# Patient Record
Sex: MALE | Race: WHITE | NOT HISPANIC OR LATINO | Employment: OTHER | URBAN - METROPOLITAN AREA
[De-identification: names, ages, dates, MRNs, and addresses within clinical notes are randomized per-mention and may not be internally consistent; named-entity substitution may affect disease eponyms.]

---

## 2017-12-11 ENCOUNTER — IOP CHECK (OUTPATIENT)
Dept: URBAN - METROPOLITAN AREA CLINIC 43 | Facility: CLINIC | Age: 82
End: 2017-12-11

## 2017-12-11 DIAGNOSIS — H40.1132: ICD-10-CM

## 2017-12-11 PROCEDURE — G8428 CUR MEDS NOT DOCUMENT: HCPCS

## 2017-12-11 PROCEDURE — 92012 INTRM OPH EXAM EST PATIENT: CPT

## 2017-12-11 PROCEDURE — G8756 NO BP MEASURE DOC: HCPCS

## 2017-12-11 PROCEDURE — 92083 EXTENDED VISUAL FIELD XM: CPT

## 2017-12-11 PROCEDURE — 1036F TOBACCO NON-USER: CPT

## 2017-12-11 ASSESSMENT — TONOMETRY
OS_IOP_MMHG: 14
OD_IOP_MMHG: 13

## 2017-12-11 ASSESSMENT — VISUAL ACUITY
OD_SC: 20/60-2
OS_SC: 20/30-1

## 2018-06-18 ENCOUNTER — ESTABLISHED COMPREHENSIVE EXAM (OUTPATIENT)
Dept: URBAN - METROPOLITAN AREA CLINIC 43 | Facility: CLINIC | Age: 83
End: 2018-06-18

## 2018-06-18 DIAGNOSIS — Z96.1: ICD-10-CM

## 2018-06-18 DIAGNOSIS — H40.1132: ICD-10-CM

## 2018-06-18 DIAGNOSIS — H04.123: ICD-10-CM

## 2018-06-18 DIAGNOSIS — E11.9: ICD-10-CM

## 2018-06-18 PROCEDURE — G8427 DOCREV CUR MEDS BY ELIG CLIN: HCPCS

## 2018-06-18 PROCEDURE — 92014 COMPRE OPH EXAM EST PT 1/>: CPT

## 2018-06-18 PROCEDURE — G8756 NO BP MEASURE DOC: HCPCS

## 2018-06-18 PROCEDURE — 92015 DETERMINE REFRACTIVE STATE: CPT

## 2018-06-18 PROCEDURE — 2022F DILAT RTA XM EVC RTNOPTHY: CPT

## 2018-06-18 PROCEDURE — 1036F TOBACCO NON-USER: CPT

## 2018-06-18 PROCEDURE — 92133 CPTRZD OPH DX IMG PST SGM ON: CPT

## 2018-06-18 ASSESSMENT — TONOMETRY: OS_IOP_MMHG: 14

## 2018-06-18 ASSESSMENT — VISUAL ACUITY
OS_CC: J2 OTC
OS_SC: 20/25-2
OD_PH: 20/70+1
OD_SC: 20/80+1
OS_SC: J10
OD_SC: J12

## 2018-09-19 ENCOUNTER — EST. PATIENT EMERGENCY (OUTPATIENT)
Dept: URBAN - METROPOLITAN AREA CLINIC 43 | Facility: CLINIC | Age: 83
End: 2018-09-19

## 2018-09-19 DIAGNOSIS — H00.12: ICD-10-CM

## 2018-09-19 PROCEDURE — 1036F TOBACCO NON-USER: CPT

## 2018-09-19 PROCEDURE — G9903 PT SCRN TBCO ID AS NON USER: HCPCS

## 2018-09-19 PROCEDURE — 92012 INTRM OPH EXAM EST PATIENT: CPT

## 2018-09-19 PROCEDURE — G8428 CUR MEDS NOT DOCUMENT: HCPCS

## 2018-09-19 PROCEDURE — G8756 NO BP MEASURE DOC: HCPCS

## 2018-09-19 RX ORDER — NEOMYCIN SULFATE, POLYMYXIN B SULFATE AND DEXAMETHASONE 3.5; 10000; 1 MG/G; [USP'U]/G; MG/G: OINTMENT OPHTHALMIC

## 2018-09-19 ASSESSMENT — TONOMETRY
OD_IOP_MMHG: 13
OS_IOP_MMHG: 14

## 2018-09-19 ASSESSMENT — VISUAL ACUITY
OS_SC: 20/20-
OD_SC: 20/80-1+1

## 2018-12-03 ENCOUNTER — IOP CHECK (OUTPATIENT)
Dept: URBAN - METROPOLITAN AREA CLINIC 43 | Facility: CLINIC | Age: 83
End: 2018-12-03

## 2018-12-03 DIAGNOSIS — H40.1132: ICD-10-CM

## 2018-12-03 PROCEDURE — 92012 INTRM OPH EXAM EST PATIENT: CPT

## 2018-12-03 PROCEDURE — 1036F TOBACCO NON-USER: CPT

## 2018-12-03 PROCEDURE — G9903 PT SCRN TBCO ID AS NON USER: HCPCS

## 2018-12-03 PROCEDURE — 2027F OPTIC NERVE HEAD EVAL DONE: CPT

## 2018-12-03 PROCEDURE — 3284F IOP RED >=15% PRE-NTRV LVL: CPT

## 2018-12-03 PROCEDURE — 92083 EXTENDED VISUAL FIELD XM: CPT

## 2018-12-03 PROCEDURE — G8756 NO BP MEASURE DOC: HCPCS

## 2018-12-03 PROCEDURE — G8428 CUR MEDS NOT DOCUMENT: HCPCS

## 2018-12-03 ASSESSMENT — VISUAL ACUITY
OS_SC: 20/25-3
OD_SC: 20/100

## 2018-12-03 ASSESSMENT — TONOMETRY
OD_IOP_MMHG: 16
OS_IOP_MMHG: 15

## 2019-06-03 ENCOUNTER — ESTABLISHED COMPREHENSIVE EXAM (OUTPATIENT)
Dept: URBAN - METROPOLITAN AREA CLINIC 43 | Facility: CLINIC | Age: 84
End: 2019-06-03

## 2019-06-03 DIAGNOSIS — H04.123: ICD-10-CM

## 2019-06-03 DIAGNOSIS — H40.1132: ICD-10-CM

## 2019-06-03 DIAGNOSIS — E11.9: ICD-10-CM

## 2019-06-03 DIAGNOSIS — Z96.1: ICD-10-CM

## 2019-06-03 PROCEDURE — 92015 DETERMINE REFRACTIVE STATE: CPT

## 2019-06-03 PROCEDURE — 92014 COMPRE OPH EXAM EST PT 1/>: CPT

## 2019-06-03 PROCEDURE — 92133 CPTRZD OPH DX IMG PST SGM ON: CPT

## 2019-06-03 ASSESSMENT — TONOMETRY
OS_IOP_MMHG: 14
OD_IOP_MMHG: 13

## 2019-06-03 ASSESSMENT — VISUAL ACUITY
OD_SC: 20/80-1
OD_CC: J7
OS_SC: J8
OD_SC: J10
OS_SC: 20/20
OS_CC: J1

## 2019-12-04 ENCOUNTER — IOP CHECK (OUTPATIENT)
Dept: URBAN - METROPOLITAN AREA CLINIC 43 | Facility: CLINIC | Age: 84
End: 2019-12-04

## 2019-12-04 DIAGNOSIS — E11.9: ICD-10-CM

## 2019-12-04 DIAGNOSIS — H04.123: ICD-10-CM

## 2019-12-04 DIAGNOSIS — H40.1132: ICD-10-CM

## 2019-12-04 PROCEDURE — 92083 EXTENDED VISUAL FIELD XM: CPT

## 2019-12-04 PROCEDURE — 92012 INTRM OPH EXAM EST PATIENT: CPT

## 2019-12-04 ASSESSMENT — VISUAL ACUITY
OS_SC: 20/20
OD_SC: 20/80

## 2020-06-10 ENCOUNTER — ESTABLISHED COMPREHENSIVE EXAM (OUTPATIENT)
Dept: URBAN - METROPOLITAN AREA CLINIC 43 | Facility: CLINIC | Age: 85
End: 2020-06-10

## 2020-06-10 DIAGNOSIS — E11.9: ICD-10-CM

## 2020-06-10 DIAGNOSIS — H40.1132: ICD-10-CM

## 2020-06-10 DIAGNOSIS — H04.123: ICD-10-CM

## 2020-06-10 DIAGNOSIS — Z96.1: ICD-10-CM

## 2020-06-10 PROCEDURE — 92133 CPTRZD OPH DX IMG PST SGM ON: CPT

## 2020-06-10 PROCEDURE — 92014 COMPRE OPH EXAM EST PT 1/>: CPT

## 2020-06-10 PROCEDURE — 92015 DETERMINE REFRACTIVE STATE: CPT

## 2020-06-10 ASSESSMENT — TONOMETRY
OD_IOP_MMHG: 16
OS_IOP_MMHG: 17

## 2020-06-10 ASSESSMENT — VISUAL ACUITY
OD_SC: 20/80+1
OD_SC: J12
OD_CC: J10
OS_SC: J6
OS_CC: J1
OS_SC: 20/20-2

## 2020-12-16 ENCOUNTER — IOP CHECK (OUTPATIENT)
Dept: URBAN - METROPOLITAN AREA CLINIC 43 | Facility: CLINIC | Age: 85
End: 2020-12-16

## 2020-12-16 DIAGNOSIS — H04.123: ICD-10-CM

## 2020-12-16 DIAGNOSIS — E11.9: ICD-10-CM

## 2020-12-16 DIAGNOSIS — H40.1132: ICD-10-CM

## 2020-12-16 DIAGNOSIS — Z96.1: ICD-10-CM

## 2020-12-16 PROCEDURE — 92083 EXTENDED VISUAL FIELD XM: CPT

## 2020-12-16 PROCEDURE — 92250 FUNDUS PHOTOGRAPHY W/I&R: CPT

## 2020-12-16 PROCEDURE — 92012 INTRM OPH EXAM EST PATIENT: CPT

## 2020-12-16 ASSESSMENT — TONOMETRY
OD_IOP_MMHG: 9
OS_IOP_MMHG: 17

## 2020-12-16 ASSESSMENT — VISUAL ACUITY
OD_SC: 20/100+1
OS_SC: 20/20-2

## 2021-06-16 ENCOUNTER — ESTABLISHED COMPREHENSIVE EXAM (OUTPATIENT)
Dept: URBAN - METROPOLITAN AREA CLINIC 43 | Facility: CLINIC | Age: 86
End: 2021-06-16

## 2021-06-16 DIAGNOSIS — Z96.1: ICD-10-CM

## 2021-06-16 DIAGNOSIS — H40.1132: ICD-10-CM

## 2021-06-16 DIAGNOSIS — E11.9: ICD-10-CM

## 2021-06-16 DIAGNOSIS — H04.123: ICD-10-CM

## 2021-06-16 PROCEDURE — 92015 DETERMINE REFRACTIVE STATE: CPT

## 2021-06-16 PROCEDURE — 92133 CPTRZD OPH DX IMG PST SGM ON: CPT

## 2021-06-16 PROCEDURE — 92014 COMPRE OPH EXAM EST PT 1/>: CPT

## 2021-06-16 ASSESSMENT — VISUAL ACUITY
OS_CC: J1
OS_SC: J6-
OD_SC: 20/100-1
OD_SC: J12
OS_SC: 20/20
OD_CC: J12

## 2021-06-16 ASSESSMENT — TONOMETRY
OS_IOP_MMHG: 15
OD_IOP_MMHG: 9

## 2021-12-16 ENCOUNTER — ESTABLISHED PATIENT (OUTPATIENT)
Dept: URBAN - METROPOLITAN AREA CLINIC 43 | Facility: CLINIC | Age: 86
End: 2021-12-16

## 2021-12-16 DIAGNOSIS — E11.9: ICD-10-CM

## 2021-12-16 DIAGNOSIS — H40.1132: ICD-10-CM

## 2021-12-16 DIAGNOSIS — H04.123: ICD-10-CM

## 2021-12-16 PROCEDURE — 92083 EXTENDED VISUAL FIELD XM: CPT

## 2021-12-16 PROCEDURE — 92012 INTRM OPH EXAM EST PATIENT: CPT

## 2021-12-16 ASSESSMENT — VISUAL ACUITY
OS_SC: 20/20-2
OD_SC: 20/100-1

## 2021-12-16 ASSESSMENT — TONOMETRY
OS_IOP_MMHG: 18
OD_IOP_MMHG: 9

## 2022-07-19 ENCOUNTER — COMPREHENSIVE EXAM (OUTPATIENT)
Dept: URBAN - METROPOLITAN AREA CLINIC 43 | Facility: CLINIC | Age: 87
End: 2022-07-19

## 2022-07-19 DIAGNOSIS — H04.123: ICD-10-CM

## 2022-07-19 DIAGNOSIS — E11.9: ICD-10-CM

## 2022-07-19 DIAGNOSIS — Z96.1: ICD-10-CM

## 2022-07-19 DIAGNOSIS — H40.1132: ICD-10-CM

## 2022-07-19 PROCEDURE — 92015 DETERMINE REFRACTIVE STATE: CPT

## 2022-07-19 PROCEDURE — 92133 CPTRZD OPH DX IMG PST SGM ON: CPT

## 2022-07-19 PROCEDURE — 92014 COMPRE OPH EXAM EST PT 1/>: CPT

## 2022-07-19 ASSESSMENT — VISUAL ACUITY
OS_SC: 20/20-2
OS_SC: J4
OD_SC: 20/200
OD_SC: J12

## 2022-07-19 ASSESSMENT — TONOMETRY
OS_IOP_MMHG: 16
OD_IOP_MMHG: 12

## 2022-10-26 ENCOUNTER — APPOINTMENT (RX ONLY)
Dept: URBAN - METROPOLITAN AREA CLINIC 137 | Facility: CLINIC | Age: 87
Setting detail: DERMATOLOGY
End: 2022-10-26

## 2022-10-26 DIAGNOSIS — Z85.828 PERSONAL HISTORY OF OTHER MALIGNANT NEOPLASM OF SKIN: ICD-10-CM

## 2022-10-26 DIAGNOSIS — R60.0 LOCALIZED EDEMA: ICD-10-CM

## 2022-10-26 DIAGNOSIS — L57.8 OTHER SKIN CHANGES DUE TO CHRONIC EXPOSURE TO NONIONIZING RADIATION: ICD-10-CM

## 2022-10-26 DIAGNOSIS — D18.0 HEMANGIOMA: ICD-10-CM

## 2022-10-26 DIAGNOSIS — Z71.89 OTHER SPECIFIED COUNSELING: ICD-10-CM

## 2022-10-26 DIAGNOSIS — L57.0 ACTINIC KERATOSIS: ICD-10-CM

## 2022-10-26 PROBLEM — D18.01 HEMANGIOMA OF SKIN AND SUBCUTANEOUS TISSUE: Status: ACTIVE | Noted: 2022-10-26

## 2022-10-26 PROCEDURE — 17000 DESTRUCT PREMALG LESION: CPT

## 2022-10-26 PROCEDURE — ? LIQUID NITROGEN

## 2022-10-26 PROCEDURE — ? COUNSELING

## 2022-10-26 PROCEDURE — 99213 OFFICE O/P EST LOW 20 MIN: CPT | Mod: 25

## 2022-10-26 PROCEDURE — 17003 DESTRUCT PREMALG LES 2-14: CPT

## 2022-10-26 ASSESSMENT — LOCATION SIMPLE DESCRIPTION DERM
LOCATION SIMPLE: RIGHT PRETIBIAL REGION
LOCATION SIMPLE: NOSE
LOCATION SIMPLE: SCALP
LOCATION SIMPLE: LEFT FOREHEAD
LOCATION SIMPLE: LEFT PRETIBIAL REGION
LOCATION SIMPLE: ANTERIOR SCALP
LOCATION SIMPLE: LEFT TEMPLE
LOCATION SIMPLE: RIGHT FOREHEAD
LOCATION SIMPLE: CHEST

## 2022-10-26 ASSESSMENT — LOCATION ZONE DERM
LOCATION ZONE: NOSE
LOCATION ZONE: SCALP
LOCATION ZONE: TRUNK
LOCATION ZONE: LEG
LOCATION ZONE: FACE

## 2022-10-26 ASSESSMENT — LOCATION DETAILED DESCRIPTION DERM
LOCATION DETAILED: LEFT SUPERIOR TEMPLE
LOCATION DETAILED: LEFT SUPERIOR MEDIAL FOREHEAD
LOCATION DETAILED: LEFT INFERIOR LATERAL FOREHEAD
LOCATION DETAILED: LEFT SUPERIOR FOREHEAD
LOCATION DETAILED: RIGHT INFERIOR LATERAL FOREHEAD
LOCATION DETAILED: NASAL SUPRATIP
LOCATION DETAILED: MID-FRONTAL SCALP
LOCATION DETAILED: NASAL TIP
LOCATION DETAILED: RIGHT LATERAL FOREHEAD
LOCATION DETAILED: LEFT CENTRAL PARIETAL SCALP
LOCATION DETAILED: RIGHT PROXIMAL PRETIBIAL REGION
LOCATION DETAILED: RIGHT MEDIAL INFERIOR CHEST
LOCATION DETAILED: LEFT PROXIMAL PRETIBIAL REGION

## 2023-01-19 ENCOUNTER — ESTABLISHED PATIENT (OUTPATIENT)
Dept: URBAN - METROPOLITAN AREA CLINIC 43 | Facility: CLINIC | Age: 88
End: 2023-01-19

## 2023-01-19 DIAGNOSIS — H40.1132: ICD-10-CM

## 2023-01-19 DIAGNOSIS — E11.9: ICD-10-CM

## 2023-01-19 DIAGNOSIS — H04.123: ICD-10-CM

## 2023-01-19 PROCEDURE — 92012 INTRM OPH EXAM EST PATIENT: CPT

## 2023-01-19 PROCEDURE — 92083 EXTENDED VISUAL FIELD XM: CPT

## 2023-01-19 ASSESSMENT — VISUAL ACUITY
OS_SC: 20/20-2
OD_SC: 20/200

## 2023-01-19 ASSESSMENT — TONOMETRY
OS_IOP_MMHG: 26
OD_IOP_MMHG: 12

## 2023-04-11 ENCOUNTER — APPOINTMENT (RX ONLY)
Dept: URBAN - METROPOLITAN AREA CLINIC 137 | Facility: CLINIC | Age: 88
Setting detail: DERMATOLOGY
End: 2023-04-11

## 2023-04-11 DIAGNOSIS — L89 PRESSURE ULCER: ICD-10-CM

## 2023-04-11 DIAGNOSIS — Z85.828 PERSONAL HISTORY OF OTHER MALIGNANT NEOPLASM OF SKIN: ICD-10-CM

## 2023-04-11 DIAGNOSIS — R60.0 LOCALIZED EDEMA: ICD-10-CM | Status: INADEQUATELY CONTROLLED

## 2023-04-11 DIAGNOSIS — L82.1 OTHER SEBORRHEIC KERATOSIS: ICD-10-CM

## 2023-04-11 DIAGNOSIS — L57.0 ACTINIC KERATOSIS: ICD-10-CM

## 2023-04-11 DIAGNOSIS — S31000A OPEN WOUND(S) (MULTIPLE) OF UNSPECIFIED SITE(S), WITHOUT MENTION OF COMPLICATION: ICD-10-CM | Status: RESOLVING

## 2023-04-11 DIAGNOSIS — Z71.89 OTHER SPECIFIED COUNSELING: ICD-10-CM

## 2023-04-11 DIAGNOSIS — L85.3 XEROSIS CUTIS: ICD-10-CM | Status: INADEQUATELY CONTROLLED

## 2023-04-11 DIAGNOSIS — D18.0 HEMANGIOMA: ICD-10-CM

## 2023-04-11 DIAGNOSIS — L0391 CELLULITIS AND ABSCESS OF UNSPECIFIED SITES: ICD-10-CM

## 2023-04-11 DIAGNOSIS — L81.4 OTHER MELANIN HYPERPIGMENTATION: ICD-10-CM

## 2023-04-11 DIAGNOSIS — I87.2 VENOUS INSUFFICIENCY (CHRONIC) (PERIPHERAL): ICD-10-CM | Status: STABLE

## 2023-04-11 DIAGNOSIS — D69.2 OTHER NONTHROMBOCYTOPENIC PURPURA: ICD-10-CM | Status: STABLE

## 2023-04-11 DIAGNOSIS — L0390 CELLULITIS AND ABSCESS OF UNSPECIFIED SITES: ICD-10-CM

## 2023-04-11 DIAGNOSIS — L57.8 OTHER SKIN CHANGES DUE TO CHRONIC EXPOSURE TO NONIONIZING RADIATION: ICD-10-CM

## 2023-04-11 PROBLEM — L03.317 CELLULITIS OF BUTTOCK: Status: ACTIVE | Noted: 2023-04-11

## 2023-04-11 PROBLEM — D18.01 HEMANGIOMA OF SKIN AND SUBCUTANEOUS TISSUE: Status: ACTIVE | Noted: 2023-04-11

## 2023-04-11 PROBLEM — L89.319 PRESSURE ULCER OF RIGHT BUTTOCK, UNSPECIFIED STAGE: Status: ACTIVE | Noted: 2023-04-11

## 2023-04-11 PROBLEM — S51.812A LACERATION WITHOUT FOREIGN BODY OF LEFT FOREARM, INITIAL ENCOUNTER: Status: ACTIVE | Noted: 2023-04-11

## 2023-04-11 PROCEDURE — ? ORDER TESTS

## 2023-04-11 PROCEDURE — 17000 DESTRUCT PREMALG LESION: CPT

## 2023-04-11 PROCEDURE — ? PRESCRIPTION

## 2023-04-11 PROCEDURE — ? LIQUID NITROGEN

## 2023-04-11 PROCEDURE — ? PHOTO-DOCUMENTATION

## 2023-04-11 PROCEDURE — ? REFERRAL

## 2023-04-11 PROCEDURE — 99214 OFFICE O/P EST MOD 30 MIN: CPT | Mod: 25

## 2023-04-11 PROCEDURE — 17003 DESTRUCT PREMALG LES 2-14: CPT

## 2023-04-11 PROCEDURE — ? ADDITIONAL NOTES

## 2023-04-11 PROCEDURE — ? COUNSELING

## 2023-04-11 RX ORDER — SILVER SULFADIAZINE 10 MG/G
CREAM TOPICAL
Qty: 50 | Refills: 2 | Status: ERX | COMMUNITY
Start: 2023-04-11

## 2023-04-11 RX ORDER — DOXYCYCLINE HYCLATE 100 MG/1
CAPSULE, GELATIN COATED ORAL
Qty: 28 | Refills: 0 | Status: CANCELLED | COMMUNITY
Start: 2023-04-11

## 2023-04-11 RX ADMIN — DOXYCYCLINE HYCLATE: 100 CAPSULE, GELATIN COATED ORAL at 00:00

## 2023-04-11 RX ADMIN — SILVER SULFADIAZINE: 10 CREAM TOPICAL at 00:00

## 2023-04-11 ASSESSMENT — LOCATION DETAILED DESCRIPTION DERM
LOCATION DETAILED: LEFT SUPERIOR PARIETAL SCALP
LOCATION DETAILED: LEFT CENTRAL TEMPLE
LOCATION DETAILED: SUPERIOR LUMBAR SPINE
LOCATION DETAILED: RIGHT ANTERIOR DISTAL THIGH
LOCATION DETAILED: RIGHT DISTAL PRETIBIAL REGION
LOCATION DETAILED: RIGHT SUPERIOR UPPER BACK
LOCATION DETAILED: LEFT DISTAL PRETIBIAL REGION
LOCATION DETAILED: LEFT PROXIMAL PRETIBIAL REGION
LOCATION DETAILED: PERIUMBILICAL SKIN
LOCATION DETAILED: RIGHT SUPERIOR LATERAL MALAR CHEEK
LOCATION DETAILED: SUPERIOR THORACIC SPINE
LOCATION DETAILED: RIGHT PROXIMAL PRETIBIAL REGION
LOCATION DETAILED: RIGHT DISTAL DORSAL FOREARM
LOCATION DETAILED: LEFT INFERIOR UPPER BACK
LOCATION DETAILED: LEFT ULNAR DORSAL HAND
LOCATION DETAILED: RIGHT BUTTOCK
LOCATION DETAILED: STERNUM
LOCATION DETAILED: LEFT LATERAL ZYGOMA
LOCATION DETAILED: LEFT ANTERIOR DISTAL THIGH
LOCATION DETAILED: RIGHT MID-UPPER BACK
LOCATION DETAILED: EPIGASTRIC SKIN
LOCATION DETAILED: LEFT DISTAL DORSAL FOREARM

## 2023-04-11 ASSESSMENT — LOCATION SIMPLE DESCRIPTION DERM
LOCATION SIMPLE: LEFT UPPER BACK
LOCATION SIMPLE: UPPER BACK
LOCATION SIMPLE: SCALP
LOCATION SIMPLE: LEFT TEMPLE
LOCATION SIMPLE: CHEST
LOCATION SIMPLE: RIGHT PRETIBIAL REGION
LOCATION SIMPLE: LEFT ZYGOMA
LOCATION SIMPLE: ABDOMEN
LOCATION SIMPLE: LEFT THIGH
LOCATION SIMPLE: LEFT HAND
LOCATION SIMPLE: RIGHT BUTTOCK
LOCATION SIMPLE: RIGHT CHEEK
LOCATION SIMPLE: LOWER BACK
LOCATION SIMPLE: RIGHT THIGH
LOCATION SIMPLE: LEFT FOREARM
LOCATION SIMPLE: RIGHT FOREARM
LOCATION SIMPLE: RIGHT UPPER BACK
LOCATION SIMPLE: LEFT PRETIBIAL REGION

## 2023-04-11 ASSESSMENT — LOCATION ZONE DERM
LOCATION ZONE: ARM
LOCATION ZONE: TRUNK
LOCATION ZONE: SCALP
LOCATION ZONE: FACE
LOCATION ZONE: HAND
LOCATION ZONE: LEG

## 2023-04-11 NOTE — PROCEDURE: ADDITIONAL NOTES
Detail Level: Zone
Additional Notes: No signs of active infection. Bandaged with island dressing.
Render Risk Assessment In Note?: no
Additional Notes: Pt fell recently and caught himself with his Lt arm. Pt has had X-rays and US. Per Pt came back neg. Currently Lt forearm and Lt hand swollen. No signs of active infection. \\n\\npt reports is on a diuretic three times a week and uses compression stockings ( which pt has on now)
Additional Notes: Secondary to cellulitis. Area is red and swollen. Photos taken for reference. Wound care referral is needed. Unclear if this area was initially an abscess, but now has skin break down and foul smell. \\nculture taken today and abx started.

## 2023-04-11 NOTE — PROCEDURE: ORDER TESTS
Lab Facility: [id number]
Expected Date Of Service: 04/11/2023
Bill For Surgical Tray: no
Performing Laboratory: [id number]
Billing Type: [employer]

## 2023-04-12 ENCOUNTER — RX ONLY (OUTPATIENT)
Age: 88
Setting detail: RX ONLY
End: 2023-04-12

## 2023-04-12 RX ORDER — DOXYCYCLINE HYCLATE 100 MG/1
CAPSULE, GELATIN COATED ORAL
Qty: 28 | Refills: 0

## 2023-05-04 ENCOUNTER — ESTABLISHED PATIENT (OUTPATIENT)
Dept: URBAN - METROPOLITAN AREA CLINIC 43 | Facility: CLINIC | Age: 88
End: 2023-05-04

## 2023-05-04 DIAGNOSIS — H40.1122: ICD-10-CM

## 2023-05-04 DIAGNOSIS — H40.1113: ICD-10-CM

## 2023-05-04 DIAGNOSIS — Z98.890: ICD-10-CM

## 2023-05-04 PROCEDURE — 92012 INTRM OPH EXAM EST PATIENT: CPT

## 2023-05-04 ASSESSMENT — VISUAL ACUITY
OD_SC: 20/100+1
OS_SC: 20/20

## 2023-05-04 ASSESSMENT — TONOMETRY
OS_IOP_MMHG: 14
OD_IOP_MMHG: 9